# Patient Record
Sex: MALE | Race: BLACK OR AFRICAN AMERICAN | NOT HISPANIC OR LATINO | URBAN - METROPOLITAN AREA
[De-identification: names, ages, dates, MRNs, and addresses within clinical notes are randomized per-mention and may not be internally consistent; named-entity substitution may affect disease eponyms.]

---

## 2022-08-10 ENCOUNTER — WEB ENCOUNTER (OUTPATIENT)
Dept: URBAN - METROPOLITAN AREA CLINIC 84 | Facility: CLINIC | Age: 62
End: 2022-08-10

## 2022-08-15 ENCOUNTER — WEB ENCOUNTER (OUTPATIENT)
Dept: URBAN - METROPOLITAN AREA CLINIC 84 | Facility: CLINIC | Age: 62
End: 2022-08-15

## 2022-08-15 ENCOUNTER — OFFICE VISIT (OUTPATIENT)
Dept: URBAN - METROPOLITAN AREA CLINIC 84 | Facility: CLINIC | Age: 62
End: 2022-08-15
Payer: MEDICARE

## 2022-08-15 VITALS
WEIGHT: 203.8 LBS | HEART RATE: 75 BPM | SYSTOLIC BLOOD PRESSURE: 163 MMHG | HEIGHT: 72 IN | TEMPERATURE: 98 F | DIASTOLIC BLOOD PRESSURE: 90 MMHG | BODY MASS INDEX: 27.6 KG/M2

## 2022-08-15 DIAGNOSIS — Z86.010 PERSONAL HISTORY OF COLONIC POLYPS: ICD-10-CM

## 2022-08-15 PROCEDURE — 99202 OFFICE O/P NEW SF 15 MIN: CPT | Performed by: INTERNAL MEDICINE

## 2022-08-15 RX ORDER — MIRTAZAPINE 15 MG/1
TABLET ORAL
Qty: 90 TABLET | Status: ON HOLD | COMMUNITY

## 2022-08-15 RX ORDER — GABAPENTIN 600 MG/1
TABLET, FILM COATED ORAL
Qty: 0 | Refills: 0 | Status: ACTIVE | COMMUNITY
Start: 1900-01-01

## 2022-08-15 RX ORDER — FLUTICASONE FUROATE, UMECLIDINIUM BROMIDE AND VILANTEROL TRIFENATATE 100; 62.5; 25 UG/1; UG/1; UG/1
POWDER RESPIRATORY (INHALATION)
Qty: 60 EACH | Status: ON HOLD | COMMUNITY

## 2022-08-15 RX ORDER — OXYCODONE AND ACETAMINOPHEN 7.5; 325 MG/1; MG/1
TABLET ORAL
Qty: 45 TABLET | Status: ACTIVE | COMMUNITY

## 2022-08-15 RX ORDER — POLYETHYLENE GLYCOL 3350, SODIUM SULFATE, SODIUM CHLORIDE, POTASSIUM CHLORIDE, ASCORBIC ACID, SODIUM ASCORBATE 140-9-5.2G
AS DIRECTED KIT ORAL ONCE
Qty: 1 BOX | Refills: 0 | OUTPATIENT
Start: 2022-08-15 | End: 2022-08-16

## 2022-08-15 RX ORDER — TADALAFIL 5 MG/1
TAKE ONE TABLET BY MOUTH ONE TIME DAILY TABLET ORAL
Qty: 30 UNSPECIFIED | Refills: 0 | Status: ON HOLD | COMMUNITY

## 2022-08-15 RX ORDER — BROMFENAC 0.76 MG/ML
INSTILL ONE DROP TO THE AFFECTED EYE(S) TWICE A DAY FOR 3 WEEKS AND STOP ( START ONE DAY BEFORE SURGERY ) SOLUTION/ DROPS OPHTHALMIC
Qty: 5 UNSPECIFIED | Refills: 0 | Status: ACTIVE | COMMUNITY

## 2022-08-15 RX ORDER — MORPHINE SULFATE 15 MG/1
TAKE ONE TABLET BY MOUTH TWICE A DAY TABLET, FILM COATED, EXTENDED RELEASE ORAL
Qty: 30 UNSPECIFIED | Refills: 0 | Status: ACTIVE | COMMUNITY

## 2022-08-15 RX ORDER — TAMSULOSIN HYDROCHLORIDE 0.4 MG/1
TAKE ONE CAPSULE BY MOUTH ONE TIME DAILY 30 MINUTES FOLLOWING THE SAME MEAL EACH DAY CAPSULE ORAL
Qty: 90 UNSPECIFIED | Refills: 0 | Status: ACTIVE | COMMUNITY

## 2022-08-15 RX ORDER — ZOLPIDEM TARTRATE 5 MG/1
TAKE ONE TABLET BY MOUTH NIGHTLY TABLET, COATED ORAL
Qty: 15 UNSPECIFIED | Refills: 0 | Status: ACTIVE | COMMUNITY

## 2022-08-15 RX ORDER — MOXIFLOXACIN 5 MG/ML
THE DAY BEFORE SURGERY INSTILL ONE DROP INTO THE LEFT EYE THREE TIMES DAILY . AFTER SURGERY INSTILL ONE DROP TO THE AFFECTED EYE(S) FOUR TIM SOLUTION/ DROPS OPHTHALMIC
Qty: 3 UNSPECIFIED | Refills: 0 | Status: ACTIVE | COMMUNITY

## 2022-08-15 RX ORDER — OXYCODONE HYDROCHLORIDE 10 MG/1
TABLET ORAL
Qty: 0 | Refills: 0 | Status: DISCONTINUED | COMMUNITY
Start: 1900-01-01

## 2022-08-15 NOTE — HPI-TODAY'S VISIT:
63 yo pt with hx of multiple polyps in 2019 presents for f/u surveillance colonoscopy. He is otherwise asymptomatic.

## 2022-08-17 ENCOUNTER — TELEPHONE ENCOUNTER (OUTPATIENT)
Dept: URBAN - METROPOLITAN AREA CLINIC 84 | Facility: CLINIC | Age: 62
End: 2022-08-17

## 2022-08-17 PROBLEM — 428283002: Status: ACTIVE | Noted: 2022-08-15

## 2022-08-31 ENCOUNTER — OFFICE VISIT (OUTPATIENT)
Dept: URBAN - METROPOLITAN AREA SURGERY CENTER 20 | Facility: SURGERY CENTER | Age: 62
End: 2022-08-31

## 2024-05-02 ENCOUNTER — OFFICE VISIT (OUTPATIENT)
Dept: URBAN - METROPOLITAN AREA CLINIC 25 | Facility: CLINIC | Age: 64
End: 2024-05-02

## 2024-05-02 RX ORDER — TADALAFIL 5 MG/1
TAKE ONE TABLET BY MOUTH ONE TIME DAILY TABLET ORAL
Qty: 30 UNSPECIFIED | Refills: 0 | COMMUNITY

## 2024-05-02 RX ORDER — MORPHINE SULFATE 15 MG/1
TAKE ONE TABLET BY MOUTH TWICE A DAY TABLET, FILM COATED, EXTENDED RELEASE ORAL
Qty: 30 UNSPECIFIED | Refills: 0 | COMMUNITY

## 2024-05-02 RX ORDER — BROMFENAC 0.76 MG/ML
INSTILL ONE DROP TO THE AFFECTED EYE(S) TWICE A DAY FOR 3 WEEKS AND STOP ( START ONE DAY BEFORE SURGERY ) SOLUTION/ DROPS OPHTHALMIC
Qty: 5 UNSPECIFIED | Refills: 0 | COMMUNITY

## 2024-05-02 RX ORDER — FLUTICASONE FUROATE, UMECLIDINIUM BROMIDE AND VILANTEROL TRIFENATATE 100; 62.5; 25 UG/1; UG/1; UG/1
POWDER RESPIRATORY (INHALATION)
Qty: 60 EACH | COMMUNITY

## 2024-05-02 RX ORDER — OXYCODONE AND ACETAMINOPHEN 7.5; 325 MG/1; MG/1
TABLET ORAL
Qty: 45 TABLET | COMMUNITY

## 2024-05-02 RX ORDER — MIRTAZAPINE 15 MG/1
TABLET ORAL
Qty: 90 TABLET | COMMUNITY

## 2024-05-02 RX ORDER — GABAPENTIN 600 MG/1
TABLET, FILM COATED ORAL
Qty: 0 | Refills: 0 | COMMUNITY
Start: 1900-01-01

## 2024-05-02 RX ORDER — MOXIFLOXACIN 5 MG/ML
THE DAY BEFORE SURGERY INSTILL ONE DROP INTO THE LEFT EYE THREE TIMES DAILY . AFTER SURGERY INSTILL ONE DROP TO THE AFFECTED EYE(S) FOUR TIM SOLUTION/ DROPS OPHTHALMIC
Qty: 3 UNSPECIFIED | Refills: 0 | COMMUNITY

## 2024-05-02 RX ORDER — TAMSULOSIN HYDROCHLORIDE 0.4 MG/1
TAKE ONE CAPSULE BY MOUTH ONE TIME DAILY 30 MINUTES FOLLOWING THE SAME MEAL EACH DAY CAPSULE ORAL
Qty: 90 UNSPECIFIED | Refills: 0 | COMMUNITY

## 2024-05-02 RX ORDER — ZOLPIDEM TARTRATE 5 MG/1
TAKE ONE TABLET BY MOUTH NIGHTLY TABLET, COATED ORAL
Qty: 15 UNSPECIFIED | Refills: 0 | COMMUNITY